# Patient Record
Sex: FEMALE | URBAN - METROPOLITAN AREA
[De-identification: names, ages, dates, MRNs, and addresses within clinical notes are randomized per-mention and may not be internally consistent; named-entity substitution may affect disease eponyms.]

---

## 2023-05-17 ENCOUNTER — OFFICE VISIT (OUTPATIENT)
Dept: URBAN - METROPOLITAN AREA CLINIC 37 | Facility: CLINIC | Age: 53
End: 2023-05-17
Payer: COMMERCIAL

## 2023-05-17 DIAGNOSIS — H53.40 UNSPECIFIED VISUAL FIELD DEFECTS: ICD-10-CM

## 2023-05-17 DIAGNOSIS — H52.03 HYPERMETROPIA, BILATERAL: ICD-10-CM

## 2023-05-17 DIAGNOSIS — D44.3 TUMOR OF UNCERTAIN BEHAVIOR OF PITUITARY GLAND: Primary | ICD-10-CM

## 2023-05-17 PROCEDURE — 99204 OFFICE O/P NEW MOD 45 MIN: CPT | Performed by: OPTOMETRIST

## 2023-05-17 ASSESSMENT — INTRAOCULAR PRESSURE
OD: 15
OS: 16

## 2023-05-17 ASSESSMENT — VISUAL ACUITY
OD: 20/20
OS: 20/50

## 2023-05-17 NOTE — IMPRESSION/PLAN
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 Plan: Pt feels [facility] OS as decreased since last year. Last MRI was last year.  RECD MRI asap.  and refer to [address] for [facility] and [address] [id number]

## 2023-05-17 NOTE — IMPRESSION/PLAN
Impression: Unspecified visual field defects: H53.40.  Plan: PT states [facility] OS has AMB OS, since childhood

## 2023-05-17 NOTE — IMPRESSION/PLAN
Impression: Unspecified visual field defects: H53.40. Plan: most likely sec to Pit Tumor.  refer to Neuro

## 2024-08-08 ENCOUNTER — OFFICE VISIT (OUTPATIENT)
Dept: URBAN - METROPOLITAN AREA CLINIC 37 | Facility: CLINIC | Age: 54
End: 2024-08-08
Payer: COMMERCIAL

## 2024-08-08 DIAGNOSIS — H53.40 UNSPECIFIED VISUAL FIELD DEFECTS: ICD-10-CM

## 2024-08-08 DIAGNOSIS — D44.3 TUMOR OF UNCERTAIN BEHAVIOR OF PITUITARY GLAND: Primary | ICD-10-CM

## 2024-08-08 DIAGNOSIS — H52.03 HYPERMETROPIA, BILATERAL: ICD-10-CM

## 2024-08-08 PROCEDURE — 99214 OFFICE O/P EST MOD 30 MIN: CPT | Performed by: OPTOMETRIST

## 2024-08-08 ASSESSMENT — VISUAL ACUITY
OS: 20/60
OD: 20/25

## 2024-08-08 ASSESSMENT — INTRAOCULAR PRESSURE
OD: 9
OS: 13